# Patient Record
Sex: MALE | Race: WHITE | NOT HISPANIC OR LATINO | URBAN - METROPOLITAN AREA
[De-identification: names, ages, dates, MRNs, and addresses within clinical notes are randomized per-mention and may not be internally consistent; named-entity substitution may affect disease eponyms.]

---

## 2021-01-04 ENCOUNTER — OFFICE VISIT (OUTPATIENT)
Dept: URBAN - METROPOLITAN AREA CLINIC 46 | Facility: CLINIC | Age: 21
End: 2021-01-04

## 2021-02-15 ENCOUNTER — OFFICE VISIT (OUTPATIENT)
Dept: URBAN - METROPOLITAN AREA CLINIC 46 | Facility: CLINIC | Age: 21
End: 2021-02-15

## 2021-08-28 ENCOUNTER — TELEPHONE ENCOUNTER (OUTPATIENT)
Dept: URBAN - METROPOLITAN AREA CLINIC 13 | Facility: CLINIC | Age: 21
End: 2021-08-28

## 2021-08-28 RX ORDER — METRONIDAZOLE 250 MG/1
TABLET ORAL
OUTPATIENT
Start: 2021-01-04 | End: 2021-02-12

## 2021-08-29 ENCOUNTER — TELEPHONE ENCOUNTER (OUTPATIENT)
Dept: URBAN - METROPOLITAN AREA CLINIC 13 | Facility: CLINIC | Age: 21
End: 2021-08-29

## 2021-08-29 RX ORDER — BIFIDOBACTERIUM LONGUM 10MM CELL
CAPSULE ORAL
Status: ACTIVE | COMMUNITY
Start: 2021-01-04

## 2022-10-25 ENCOUNTER — OFFICE VISIT (OUTPATIENT)
Dept: URBAN - METROPOLITAN AREA CLINIC 44 | Facility: CLINIC | Age: 22
End: 2022-10-25

## 2022-10-25 RX ORDER — BIFIDOBACTERIUM LONGUM 10MM CELL
CAPSULE ORAL
COMMUNITY
Start: 2021-01-04

## 2022-11-01 ENCOUNTER — OFFICE VISIT (OUTPATIENT)
Dept: URBAN - METROPOLITAN AREA CLINIC 96 | Facility: CLINIC | Age: 22
End: 2022-11-01
Payer: COMMERCIAL

## 2022-11-01 ENCOUNTER — WEB ENCOUNTER (OUTPATIENT)
Dept: URBAN - METROPOLITAN AREA CLINIC 96 | Facility: CLINIC | Age: 22
End: 2022-11-01

## 2022-11-01 VITALS
HEIGHT: 75 IN | BODY MASS INDEX: 33.94 KG/M2 | DIASTOLIC BLOOD PRESSURE: 101 MMHG | SYSTOLIC BLOOD PRESSURE: 142 MMHG | HEART RATE: 73 BPM | TEMPERATURE: 97.8 F | WEIGHT: 273 LBS

## 2022-11-01 DIAGNOSIS — R19.4 CHANGE IN BOWEL HABITS: ICD-10-CM

## 2022-11-01 PROCEDURE — 99203 OFFICE O/P NEW LOW 30 MIN: CPT | Performed by: INTERNAL MEDICINE

## 2022-11-01 NOTE — HPI-TODAY'S VISIT:
23 yo M who is a . lives w GF. no kids. last ov 1/4/2021 w dr white  was seen for abd pain w meals and diarrhea. was rxed flagyl/align and a low fat diet. still w the same prob. flagyl helped a little. having PC w/in 15' 2 watery stool 5/7. worse w fatty/spicy foods/salads. 2/7 nl bms. occ bld per rectum. no cramping or xs gas. no wt loss

## 2022-11-07 LAB
DQ2 (DQA1 0501/0505,DQB1 02XX): NEGATIVE
DQ8 (DQA1 03XX, DQB1 0302): NEGATIVE
HLA DQB1*: 603
HLA VARIANTS DETECTED: HLA DQA1*: 1
IMMUNOGLOBULIN A: 219
INTERPRETATION: (no result)
INTERPRETATION: (no result)
RESULTS REVIEWED BY:: (no result)
TISSUE TRANSGLUTAMINASE AB, IGA: <1
TSH W/REFLEX TO FT4: 1.79

## 2022-11-17 ENCOUNTER — DASHBOARD ENCOUNTERS (OUTPATIENT)
Age: 22
End: 2022-11-17

## 2022-11-17 PROBLEM — 129851009: Status: ACTIVE | Noted: 2022-11-17
